# Patient Record
Sex: FEMALE | Race: WHITE | NOT HISPANIC OR LATINO | Employment: FULL TIME | URBAN - METROPOLITAN AREA
[De-identification: names, ages, dates, MRNs, and addresses within clinical notes are randomized per-mention and may not be internally consistent; named-entity substitution may affect disease eponyms.]

---

## 2025-02-12 ENCOUNTER — HOSPITAL ENCOUNTER (EMERGENCY)
Facility: HOSPITAL | Age: 50
Discharge: HOME | End: 2025-02-12
Payer: COMMERCIAL

## 2025-02-12 ENCOUNTER — APPOINTMENT (OUTPATIENT)
Dept: RADIOLOGY | Facility: HOSPITAL | Age: 50
End: 2025-02-12
Payer: COMMERCIAL

## 2025-02-12 VITALS
DIASTOLIC BLOOD PRESSURE: 126 MMHG | RESPIRATION RATE: 18 BRPM | SYSTOLIC BLOOD PRESSURE: 166 MMHG | WEIGHT: 225 LBS | TEMPERATURE: 98.8 F | OXYGEN SATURATION: 97 % | BODY MASS INDEX: 35.31 KG/M2 | HEART RATE: 93 BPM | HEIGHT: 67 IN

## 2025-02-12 DIAGNOSIS — M25.532 ACUTE PAIN OF LEFT WRIST: Primary | ICD-10-CM

## 2025-02-12 PROCEDURE — 73090 X-RAY EXAM OF FOREARM: CPT | Mod: LEFT SIDE | Performed by: RADIOLOGY

## 2025-02-12 PROCEDURE — 73090 X-RAY EXAM OF FOREARM: CPT | Mod: LT

## 2025-02-12 PROCEDURE — 99284 EMERGENCY DEPT VISIT MOD MDM: CPT

## 2025-02-12 PROCEDURE — 73130 X-RAY EXAM OF HAND: CPT | Mod: LT

## 2025-02-12 PROCEDURE — 73130 X-RAY EXAM OF HAND: CPT | Mod: LEFT SIDE | Performed by: RADIOLOGY

## 2025-02-12 ASSESSMENT — PAIN DESCRIPTION - PAIN TYPE: TYPE: ACUTE PAIN

## 2025-02-12 ASSESSMENT — LIFESTYLE VARIABLES
EVER HAD A DRINK FIRST THING IN THE MORNING TO STEADY YOUR NERVES TO GET RID OF A HANGOVER: NO
HAVE PEOPLE ANNOYED YOU BY CRITICIZING YOUR DRINKING: NO
TOTAL SCORE: 0
HAVE YOU EVER FELT YOU SHOULD CUT DOWN ON YOUR DRINKING: NO
EVER FELT BAD OR GUILTY ABOUT YOUR DRINKING: NO

## 2025-02-12 ASSESSMENT — COLUMBIA-SUICIDE SEVERITY RATING SCALE - C-SSRS
6. HAVE YOU EVER DONE ANYTHING, STARTED TO DO ANYTHING, OR PREPARED TO DO ANYTHING TO END YOUR LIFE?: NO
2. HAVE YOU ACTUALLY HAD ANY THOUGHTS OF KILLING YOURSELF?: NO
1. IN THE PAST MONTH, HAVE YOU WISHED YOU WERE DEAD OR WISHED YOU COULD GO TO SLEEP AND NOT WAKE UP?: NO

## 2025-02-12 ASSESSMENT — PAIN DESCRIPTION - FREQUENCY: FREQUENCY: CONSTANT/CONTINUOUS

## 2025-02-12 ASSESSMENT — PAIN SCALES - GENERAL
PAINLEVEL_OUTOF10: 6
PAINLEVEL_OUTOF10: 4

## 2025-02-12 ASSESSMENT — PAIN DESCRIPTION - LOCATION: LOCATION: HAND

## 2025-02-12 ASSESSMENT — PAIN DESCRIPTION - ORIENTATION: ORIENTATION: LEFT

## 2025-02-12 ASSESSMENT — PAIN - FUNCTIONAL ASSESSMENT: PAIN_FUNCTIONAL_ASSESSMENT: 0-10

## 2025-02-12 ASSESSMENT — PAIN DESCRIPTION - DESCRIPTORS: DESCRIPTORS: PRESSURE;SHARP

## 2025-02-13 NOTE — ED PROVIDER NOTES
EMERGENCY MEDICINE EVALUATION NOTE    History of Present Illness     Chief Complaint:   Chief Complaint   Patient presents with    Hand Injury     1700 pt slipped on ice and fell hitting rt elbow  and lt hand  pt states difficulty moving lt thumb   nothing for pain   denies hitting head no LOC no thinners        HPI: Leana Malloy is a 49 y.o. female presents with a chief complaint of fall on ice.  Patient reports around 5 PM this evening she was getting out of her jeep when she slipped and fell.  She landed on her right elbow on an outstretched left arm.  She is having pain primarily in the left wrist area.  She states that the right elbow does not have any significant pain and she has no decreased range of motion of the right elbow.  Patient did not hit her head did not lose consciousness.  She states it was purely mechanical fall.  Patient is not on any anticoagulation.  Patient denied any preceding chest pain or shortness of breath.    Previous History   History reviewed. No pertinent past medical history.  Past Surgical History:   Procedure Laterality Date    OTHER SURGICAL HISTORY  09/16/2022    Wrist fracture repair     Social History     Tobacco Use    Smoking status: Never    Smokeless tobacco: Never   Vaping Use    Vaping status: Never Used   Substance Use Topics    Alcohol use: Never    Drug use: Never     No family history on file.  No Known Allergies  No current outpatient medications    Physical Exam     Appearance: Alert, oriented , cooperative     Skin: Small abrasion noted over right elbow.  Dry skin, no lesions, rash, petechiae or purpura.      Eyes: PERRLA, EOMs intact,  Conjunctiva pink      ENT: Hearing grossly intact. Pharynx clear     Neck: Supple. Trachea at midline.      Pulmonary: Clear bilaterally. No rales, rhonchi or wheezing. No accessory muscle use or stridor.     Cardiac: Normal rate and rhythm without murmur     Abdomen: Soft, nontender, active bowel sounds.     Musculoskeletal:  "Full range of motion.  Patient has full range of motion of right elbow.  Patient is neuro vastly intact in bilateral upper extremities.  Diffuse tenderness over the left radial wrist.  Patient is able to open and close hand.  Patient able to oppose thumb to pinky but does elicit pain.  No obvious tenderness over anatomical snuffbox of left upper extremity.     Neurological:Cranial nerves II through XII are grossly intact, normal sensation, no weakness, no focal findings identified.     Results   Labs Reviewed - No data to display  XR forearm left 2 views   Final Result   No acute osseous abnormality identified.             MACRO:   None        Signed by: Kenya Castro 2/12/2025 8:14 PM   Dictation workstation:   [id number]      XR hand left 3+ views   Final Result   No acute osseous abnormality identified.             MACRO:   None        Signed by: Kenya Castro 2/12/2025 8:14 PM   Dictation workstation:   [id number]            ED Course & Medical Decision Making   Medications - No data to display  Heart Rate:  [93]   Temperature:  [37.1 °C (98.8 °F)]   Respirations:  [18]   BP: (166)/(126)   Height:  [170.2 cm (5' 7\")]   Weight:  [102 kg (225 lb)]   Pulse Ox:  [97 %]    ED Course as of 02/12/25 2107 Wed Feb 12, 2025 1941 Hide care was discussed with the patient.  Patient does have some pain and abrasion over the right elbow.  She is generally here for pain in the left wrist area.  Patient was offered other imaging but she is asymptomatic imaging of the left upper extremity. [CJ]   2053 Updated the patient on the results.  Patient's x-ray did not show any acute fractures or dislocations of the left upper extremity.  Patient was offered Velcro splint of the left lower extremity which she declined.  We discussed the plan of care going forward.  Patient will be discharged home at this time.  She was also did offer pain medication but states that she would just take Motrin at home.  She was encouraged to " follow-up with primary care provider as needed or return here immediately with any worsening symptoms. [CJ]      ED Course User Index  [CJ] Christofer Cloud PA-C         Diagnoses as of 02/12/25 2107   Acute pain of left wrist       Procedures   Procedures    Diagnosis     1. Acute pain of left wrist        Disposition   Discharge    ED Prescriptions    None         Disclaimer: This note was dictated by speech recognition. Minor errors in transcription may be present. Please call if questions.       Christofer Cloud PA-C  02/12/25 2104